# Patient Record
Sex: FEMALE | URBAN - METROPOLITAN AREA
[De-identification: names, ages, dates, MRNs, and addresses within clinical notes are randomized per-mention and may not be internally consistent; named-entity substitution may affect disease eponyms.]

---

## 2021-02-01 ENCOUNTER — TELEPHONE (OUTPATIENT)
Dept: FAMILY MEDICINE CLINIC | Facility: CLINIC | Age: 61
End: 2021-02-01

## 2021-02-01 NOTE — TELEPHONE ENCOUNTER
Can you help get this patient set up for an appt per Dr. Velez's message?  I do not have any more information other than this message regarding this patient     I called the pt there was no answer and the voicemail was not set up. Pt is ok to see  for first available or establish care with any other Provider who is accepting pts in office. OK FOR HUB TO READ AND SCHEDULED.